# Patient Record
Sex: MALE | Race: WHITE | NOT HISPANIC OR LATINO
[De-identification: names, ages, dates, MRNs, and addresses within clinical notes are randomized per-mention and may not be internally consistent; named-entity substitution may affect disease eponyms.]

---

## 2024-03-19 ENCOUNTER — NON-APPOINTMENT (OUTPATIENT)
Age: 44
End: 2024-03-19

## 2024-03-20 ENCOUNTER — NON-APPOINTMENT (OUTPATIENT)
Age: 44
End: 2024-03-20

## 2024-03-20 PROBLEM — Z00.00 ENCOUNTER FOR PREVENTIVE HEALTH EXAMINATION: Status: ACTIVE | Noted: 2024-03-20

## 2024-03-21 ENCOUNTER — APPOINTMENT (OUTPATIENT)
Dept: COLORECTAL SURGERY | Facility: CLINIC | Age: 44
End: 2024-03-21
Payer: COMMERCIAL

## 2024-03-21 VITALS
DIASTOLIC BLOOD PRESSURE: 74 MMHG | BODY MASS INDEX: 26.66 KG/M2 | HEART RATE: 79 BPM | HEIGHT: 69 IN | SYSTOLIC BLOOD PRESSURE: 123 MMHG | WEIGHT: 180 LBS | TEMPERATURE: 98.1 F

## 2024-03-21 DIAGNOSIS — F41.9 ANXIETY DISORDER, UNSPECIFIED: ICD-10-CM

## 2024-03-21 DIAGNOSIS — K62.5 HEMORRHAGE OF ANUS AND RECTUM: ICD-10-CM

## 2024-03-21 DIAGNOSIS — K64.9 UNSPECIFIED HEMORRHOIDS: ICD-10-CM

## 2024-03-21 DIAGNOSIS — Z78.9 OTHER SPECIFIED HEALTH STATUS: ICD-10-CM

## 2024-03-21 DIAGNOSIS — F17.200 NICOTINE DEPENDENCE, UNSPECIFIED, UNCOMPLICATED: ICD-10-CM

## 2024-03-21 PROCEDURE — 46600 DIAGNOSTIC ANOSCOPY SPX: CPT

## 2024-03-21 PROCEDURE — 99203 OFFICE O/P NEW LOW 30 MIN: CPT | Mod: 25

## 2024-03-21 RX ORDER — SERTRALINE HYDROCHLORIDE 100 MG/1
100 TABLET, FILM COATED ORAL
Refills: 0 | Status: ACTIVE | COMMUNITY

## 2024-03-21 RX ORDER — HYDROCORTISONE 25 MG/G
2.5 CREAM TOPICAL
Refills: 0 | Status: ACTIVE | COMMUNITY

## 2024-03-21 RX ORDER — CLONAZEPAM 0.5 MG/1
0.5 TABLET ORAL
Refills: 0 | Status: ACTIVE | COMMUNITY

## 2024-03-21 NOTE — PHYSICAL EXAM
[FreeTextEntry1] : Medical assistant present for duration of physical examination   General no acute distress, alert and oriented Psych calm, pleasant demeanor, responding appropriately to questions Nonlabored breathing Ambulating without assistance Skin normal color and pigment, no visible lesions or rashes    Anorectal Exam: Inspection no erythema, induration or fluctuance, no skin excoriation, no fissure, no external hemorrhoidal inflammation or tag formation DARIO nontender, no masses palpated, no blood on gloved finger   Procedure: Anoscopy   Pre procedure Diagnosis: BRBPR, hemorrhoids Post procedure Diagnosis: BRBPR, hemorrhoids Anesthesia: none Estimated blood loss: none Specimen: none Complications: none   Consent obtained. Anoscopy was performed by passing a lighted anoscope with lubricant jelly into the anal canal and the entire anal mucosal surface was inspected. Findings included no fissure, moderate internal hemorrhoids with mild inflammation, no visible masses or lesions in anal canal   Patient tolerated examination and procedure well.

## 2024-03-21 NOTE — ASSESSMENT
[FreeTextEntry1] : Exam findings and diagnosis were discussed at length with patient.  Recommendations including increased fiber intake, adequate daily hydration, stool softeners as needed, and sitz baths as needed and after bowel movements were discussed. Avoid constipation and diarrhea, avoid pushing/straining. Continue hydrocortisone prn. While symptoms likely related to hemorrhoids seen on anoscopy, we discussed consideration for colonscopy to rule out an additional/more proximal source. Recommend GI consultation if symptoms persist. All questions answered, patient expressed understanding and is agreeable to this plan.

## 2024-03-21 NOTE — HISTORY OF PRESENT ILLNESS
[FreeTextEntry1] : 44 y/o M presents for initial consultation  Referred by PCP Dr. Ben Silver   PMH: Anxiety PSH: Denies Denies FH of CRC/IBD Allergies: NKDA Meds: Sertraline, Clonazepam, Hydrocortisone 2.5% topical.   Never had a Colonoscopy  Patient presents for evaluation intermittent BRBPR w/ BM for a few months, mostly noted when having harder BM. Reports first episode January 2024 noted blood dripping into bowl and on TP after BM. Also c/o anal irritation and swelling. States bleeding lasted on and off or a week and then went away.   Reports in February had another episode of bleeding with BM in setting of harder stool, went to PCP who Rxed HC 2.5% which patient used for 2 weeks with relief of symptoms.   States had one episode of BRBPR this week, and restarted HC 2.5% these past 2-3 days. Has not seen blood since. Denies any anal discomfort currently.   Denies seeing GI for constipation or evaluation of hemorrhoids.   BM: 1x every other day. Small, softer pieces.  Reports occasional straining and may sit on bowl for long periods of time scrolling on phone. Just recently started taking Aloe Vera capsules to help improve BM. States too soon to see a difference. Denies use of fiber. Patient drinks about 1L of water a day.   Denies ASA/NSAIDS in the last 7 days.   Denies anal receptive sex